# Patient Record
Sex: FEMALE | Race: WHITE | HISPANIC OR LATINO | Employment: UNEMPLOYED | URBAN - METROPOLITAN AREA
[De-identification: names, ages, dates, MRNs, and addresses within clinical notes are randomized per-mention and may not be internally consistent; named-entity substitution may affect disease eponyms.]

---

## 2021-03-09 DIAGNOSIS — Z23 NEED FOR VACCINATION: ICD-10-CM

## 2021-04-15 ENCOUNTER — OFFICE VISIT (OUTPATIENT)
Dept: MEDICAL GROUP | Facility: MEDICAL CENTER | Age: 66
End: 2021-04-15
Attending: INTERNAL MEDICINE
Payer: COMMERCIAL

## 2021-04-15 VITALS
RESPIRATION RATE: 16 BRPM | OXYGEN SATURATION: 98 % | SYSTOLIC BLOOD PRESSURE: 124 MMHG | WEIGHT: 135 LBS | DIASTOLIC BLOOD PRESSURE: 82 MMHG | TEMPERATURE: 97.9 F | HEART RATE: 82 BPM

## 2021-04-15 DIAGNOSIS — Z87.448 HISTORY OF PYELONEPHRITIS: ICD-10-CM

## 2021-04-15 DIAGNOSIS — F33.0 MILD EPISODE OF RECURRENT MAJOR DEPRESSIVE DISORDER (HCC): ICD-10-CM

## 2021-04-15 DIAGNOSIS — E78.5 HYPERLIPIDEMIA, UNSPECIFIED HYPERLIPIDEMIA TYPE: ICD-10-CM

## 2021-04-15 DIAGNOSIS — Z13.1 SCREENING FOR DIABETES MELLITUS: ICD-10-CM

## 2021-04-15 PROBLEM — M17.0 PRIMARY OSTEOARTHRITIS OF BOTH KNEES: Status: ACTIVE | Noted: 2021-04-15

## 2021-04-15 PROCEDURE — 99212 OFFICE O/P EST SF 10 MIN: CPT | Performed by: INTERNAL MEDICINE

## 2021-04-15 PROCEDURE — 99204 OFFICE O/P NEW MOD 45 MIN: CPT | Performed by: INTERNAL MEDICINE

## 2021-04-15 RX ORDER — CHLORAL HYDRATE 500 MG
1000 CAPSULE ORAL 2 TIMES DAILY
COMMUNITY

## 2021-04-15 NOTE — ASSESSMENT & PLAN NOTE
She reports being diagnosed with hyperlipidemia and that her triglycerides were elevated last time it was checked about a year ago while she was in [place].  She is currently taking fish oil twice daily.

## 2021-04-15 NOTE — ASSESSMENT & PLAN NOTE
She reports a history of depression.  In the past she has been on Zoloft 50 mg daily and reports that it works well for her.  She has been off of it for about 2 months since she moved to the  from [place].  She would like to restart it as she has noticed symptoms returning.

## 2022-10-14 NOTE — PROGRESS NOTES
Kami Glez is a 66 y.o. female here for left back/abdominal samra pisano  HPI:  Previous PCP in [place]    History of pyelonephritis  She reports being diagnosed with pyelonephritis back in September of last year.  She was treated with a full course of antibiotics while in [place] and her symptoms resolved but she is having intermittent episodes of left-sided back/lateral abdominal pain and she is worried that it might be her kidneys.  She denies dysuria, hematuria, increased urinary frequency or urgency.  She denies fevers and chills.  Believes she was diagnosed with kidney stones on an ultrasound that she has had previously but she does not think she is ever passed a stone and she denies any colicky pain radiating down her groin consistently.  Certain motions and positions can cause the pain and it typically lasts for just a few seconds.  She is not currently taking any medication for it.  Occasionally she will feel some anterior abdominal pain on the lower left with bloating when she eats certain types of foods.  Reports a history of colitis but this has been under control since she changed her diet.    Mild episode of recurrent major depressive disorder (HCC)  She reports a history of depression.  In the past she has been on Zoloft 50 mg daily and reports that it works well for her.  She has been off of it for about 2 months since she moved to the  from [place].  She would like to restart it as she has noticed symptoms returning.     Hyperlipidemia  She reports being diagnosed with hyperlipidemia and that her triglycerides were elevated last time it was checked about a year ago while she was in [place].  She is currently taking fish oil twice daily.    Current medicines (including changes today)  Current Outpatient Medications   Medication Sig Dispense Refill   • Omega-3 Fatty Acids (FISH OIL) 1000 MG Cap capsule Take 1,000 mg by mouth 2 times a day.     • sertraline (ZOLOFT) 50 MG Tab Take 1  tablet by mouth every day. 90 tablet 3     No current facility-administered medications for this visit.     She  has a past medical history of Hyperlipidemia.  She  has a past surgical history that includes bartholin gland excision and tonsillectomy.  Social History     Tobacco Use   • Smoking status: Never Smoker   • Smokeless tobacco: Never Used   Substance Use Topics   • Alcohol use: Never   • Drug use: Not on file     Social History     Social History Narrative   • Not on file     Family History   Problem Relation Age of Onset   • Hypertension Mother    • Heart Disease Mother    • Diabetes Father    • Cancer Neg Hx          ROS  As above in HPI  All other systems reviewed and are negative     Objective:     Vitals:    04/15/21 1038   BP: 124/82   Pulse: 82   Resp: 16   Temp: 36.6 °C (97.9 °F)   SpO2: 98%     There is no height or weight on file to calculate BMI.  Physical Exam:    Constitutional: Alert, no distress.  Skin: Warm, dry, good turgor, no rashes in visible areas.  Eye: Equal, round and reactive, conjunctiva clear, lids normal.  ENMT: Lips without lesions, good dentition, oropharynx clear, TM's clear bilaterally.  Neck: Trachea midline, no masses, no thyromegaly. No cervical or supraclavicular lymphadenopathy.  Respiratory: Unlabored respiratory effort, lungs clear to auscultation, no wheezes, no ronchi.  Cardiovascular: Regular rate and rhythm, no murmurs appreciated, no lower extremity edema.  Abdomen: Soft, minimal tenderness to palpation over LLQ, no masses, no hepatosplenomegaly.  Psych: Alert and oriented x3, normal affect and mood.  Back: no CVA tenderness, no tenderness to palpation over lumbar paraspinal muscles      Assessment and Plan:   The following treatment plan was discussed    1. History of pyelonephritis  I do not have any concerns about recurrence of her pyelonephritis at this time.  She is asymptomatic from a urinary standpoint and I think that her back pain is musculoskeletal in  nature.  Reassurance was provided today.  We will obtain updated labs.  - Comp Metabolic Panel; Future    2. Screening for diabetes mellitus  - HEMOGLOBIN A1C; Future    3. Mild episode of recurrent major depressive disorder (HCC)  Was previously well controlled with sertraline which I have refilled today.  - sertraline (ZOLOFT) 50 MG Tab; Take 1 tablet by mouth every day.  Dispense: 90 tablet; Refill: 3    4. Hyperlipidemia, unspecified hyperlipidemia type  She will continue fish oil and obtain updated labs  -LIPID PANEL      Followup: Return if symptoms worsen or fail to improve.          Mastoid Interpolation Flap Text: A decision was made to reconstruct the defect utilizing an interpolation axial flap and a staged reconstruction.  A telfa template was made of the defect.  This telfa template was then used to outline the mastoid interpolation flap.  The donor area for the pedicle flap was then injected with anesthesia.  The flap was excised through the skin and subcutaneous tissue down to the layer of the underlying musculature.  The pedicle flap was carefully excised within this deep plane to maintain its blood supply.  The edges of the donor site were undermined.   The donor site was closed in a primary fashion.  The pedicle was then rotated into position and sutured.  Once the tube was sutured into place, adequate blood supply was confirmed with blanching and refill.  The pedicle was then wrapped with xeroform gauze and dressed appropriately with a telfa and gauze bandage to ensure continued blood supply and protect the attached pedicle.

## 2025-04-28 NOTE — ASSESSMENT & PLAN NOTE
Provider to review therapy plan and sign off. [employer] PA Department, please obtain prior authorization.    She reports being diagnosed with pyelonephritis back in September of last year.  She was treated with a full course of antibiotics while in [place] and her symptoms resolved but she is having intermittent episodes of left-sided back/lateral abdominal pain and she is worried that it might be her kidneys.  She denies dysuria, hematuria, increased urinary frequency or urgency.  She denies fevers and chills.  Believes she was diagnosed with kidney stones on an ultrasound that she has had previously but she does not think she is ever passed a stone and she denies any colicky pain radiating down her groin consistently.